# Patient Record
Sex: MALE | Race: WHITE | NOT HISPANIC OR LATINO
[De-identification: names, ages, dates, MRNs, and addresses within clinical notes are randomized per-mention and may not be internally consistent; named-entity substitution may affect disease eponyms.]

---

## 2021-11-17 PROBLEM — Z00.00 ENCOUNTER FOR PREVENTIVE HEALTH EXAMINATION: Status: ACTIVE | Noted: 2021-11-17

## 2021-12-06 ENCOUNTER — APPOINTMENT (OUTPATIENT)
Dept: PHYSICAL MEDICINE AND REHAB | Facility: CLINIC | Age: 51
End: 2021-12-06

## 2021-12-06 VITALS — HEART RATE: 78 BPM | SYSTOLIC BLOOD PRESSURE: 128 MMHG | OXYGEN SATURATION: 99 % | DIASTOLIC BLOOD PRESSURE: 90 MMHG

## 2021-12-06 DIAGNOSIS — Z87.828 PERSONAL HISTORY OF OTHER (HEALED) PHYSICAL INJURY AND TRAUMA: ICD-10-CM

## 2021-12-06 DIAGNOSIS — Z87.09 PERSONAL HISTORY OF OTHER DISEASES OF THE RESPIRATORY SYSTEM: ICD-10-CM

## 2022-01-24 ENCOUNTER — APPOINTMENT (OUTPATIENT)
Dept: PHYSICAL MEDICINE AND REHAB | Facility: CLINIC | Age: 52
End: 2022-01-24

## 2022-03-15 ENCOUNTER — APPOINTMENT (OUTPATIENT)
Dept: PHYSICAL MEDICINE AND REHAB | Facility: CLINIC | Age: 52
End: 2022-03-15

## 2022-04-11 ENCOUNTER — APPOINTMENT (OUTPATIENT)
Dept: PHYSICAL MEDICINE AND REHAB | Facility: CLINIC | Age: 52
End: 2022-04-11

## 2022-04-13 ENCOUNTER — NON-APPOINTMENT (OUTPATIENT)
Age: 52
End: 2022-04-13

## 2022-06-20 ENCOUNTER — APPOINTMENT (OUTPATIENT)
Dept: PHYSICAL MEDICINE AND REHAB | Facility: CLINIC | Age: 52
End: 2022-06-20
Payer: OTHER MISCELLANEOUS

## 2022-06-20 PROCEDURE — 99213 OFFICE O/P EST LOW 20 MIN: CPT

## 2022-06-20 PROCEDURE — 99072 ADDL SUPL MATRL&STAF TM PHE: CPT

## 2022-08-01 ENCOUNTER — APPOINTMENT (OUTPATIENT)
Dept: PHYSICAL MEDICINE AND REHAB | Facility: CLINIC | Age: 52
End: 2022-08-01

## 2022-08-01 PROCEDURE — 99213 OFFICE O/P EST LOW 20 MIN: CPT

## 2022-08-01 PROCEDURE — 99072 ADDL SUPL MATRL&STAF TM PHE: CPT

## 2022-09-14 ENCOUNTER — APPOINTMENT (OUTPATIENT)
Dept: PHYSICAL MEDICINE AND REHAB | Facility: CLINIC | Age: 52
End: 2022-09-14

## 2022-09-14 PROCEDURE — 99213 OFFICE O/P EST LOW 20 MIN: CPT

## 2022-09-14 PROCEDURE — 99072 ADDL SUPL MATRL&STAF TM PHE: CPT

## 2022-10-27 ENCOUNTER — APPOINTMENT (OUTPATIENT)
Dept: PHYSICAL MEDICINE AND REHAB | Facility: CLINIC | Age: 52
End: 2022-10-27

## 2022-10-27 PROCEDURE — 99072 ADDL SUPL MATRL&STAF TM PHE: CPT

## 2022-10-27 PROCEDURE — 99212 OFFICE O/P EST SF 10 MIN: CPT

## 2022-12-14 ENCOUNTER — APPOINTMENT (OUTPATIENT)
Dept: PHYSICAL MEDICINE AND REHAB | Facility: CLINIC | Age: 52
End: 2022-12-14

## 2022-12-14 PROCEDURE — 99213 OFFICE O/P EST LOW 20 MIN: CPT

## 2022-12-14 PROCEDURE — 99072 ADDL SUPL MATRL&STAF TM PHE: CPT

## 2023-01-30 ENCOUNTER — APPOINTMENT (OUTPATIENT)
Dept: PHYSICAL MEDICINE AND REHAB | Facility: CLINIC | Age: 53
End: 2023-01-30
Payer: OTHER MISCELLANEOUS

## 2023-01-30 PROCEDURE — 99072 ADDL SUPL MATRL&STAF TM PHE: CPT

## 2023-01-30 PROCEDURE — 99213 OFFICE O/P EST LOW 20 MIN: CPT

## 2023-03-20 ENCOUNTER — APPOINTMENT (OUTPATIENT)
Dept: PHYSICAL MEDICINE AND REHAB | Facility: CLINIC | Age: 53
End: 2023-03-20
Payer: OTHER MISCELLANEOUS

## 2023-03-20 PROCEDURE — 99213 OFFICE O/P EST LOW 20 MIN: CPT

## 2023-03-20 PROCEDURE — 99072 ADDL SUPL MATRL&STAF TM PHE: CPT

## 2023-04-24 ENCOUNTER — APPOINTMENT (OUTPATIENT)
Dept: PHYSICAL MEDICINE AND REHAB | Facility: CLINIC | Age: 53
End: 2023-04-24
Payer: OTHER MISCELLANEOUS

## 2023-04-24 PROCEDURE — 99213 OFFICE O/P EST LOW 20 MIN: CPT

## 2023-06-14 ENCOUNTER — APPOINTMENT (OUTPATIENT)
Dept: PHYSICAL MEDICINE AND REHAB | Facility: CLINIC | Age: 53
End: 2023-06-14
Payer: OTHER MISCELLANEOUS

## 2023-06-14 PROCEDURE — 99213 OFFICE O/P EST LOW 20 MIN: CPT

## 2023-08-02 ENCOUNTER — APPOINTMENT (OUTPATIENT)
Dept: PHYSICAL MEDICINE AND REHAB | Facility: CLINIC | Age: 53
End: 2023-08-02
Payer: OTHER MISCELLANEOUS

## 2023-08-02 PROCEDURE — 99213 OFFICE O/P EST LOW 20 MIN: CPT

## 2023-11-02 ENCOUNTER — APPOINTMENT (OUTPATIENT)
Dept: PHYSICAL MEDICINE AND REHAB | Facility: CLINIC | Age: 53
End: 2023-11-02
Payer: OTHER MISCELLANEOUS

## 2023-11-02 PROCEDURE — 99213 OFFICE O/P EST LOW 20 MIN: CPT

## 2024-01-08 ENCOUNTER — APPOINTMENT (OUTPATIENT)
Dept: PHYSICAL MEDICINE AND REHAB | Facility: CLINIC | Age: 54
End: 2024-01-08
Payer: OTHER MISCELLANEOUS

## 2024-01-08 PROCEDURE — 99213 OFFICE O/P EST LOW 20 MIN: CPT

## 2024-01-08 NOTE — PHYSICAL EXAM
[Normal] : Oriented to person, place, and time, insight and judgement were intact and the affect was normal [Normal Male] : prostate smooth, symmetric with no modularity or induration [FreeTextEntry1] : Exam of Lumbar Spine  Flexion 50 degrees Extension 5 degrees Lateral Bend R 25 degrees     L 20 degrees  MMT L/E: Right hip flexion and abduction 4+/5 Right knee ext 5-/5   Palp: tenderness and spasm involving the bilateral gluteal musculature with greater involvement on the right. Tenderness and spasm involving the bilateral lower lumbar paraspinal musculature  EXAMINATION OF RIGHT KNEE:   Flexion: 115 Extension: full  Palpation: Medial and lateral facet of patella tender.   Swelling: [-] No increase temperature Patella Compression: [+] Louisa Test: [-] Patella Femoral Clicking: [+]    [de-identified] : see exam  [de-identified] : see exam  [de-identified] : see exam

## 2024-01-08 NOTE — ASSESSMENT
[Indicate if, in your opinion, the incident that the patient described was the competent medical cause of this injury/illness.] : The incident that the patient described was the competent medical cause of this injury/illness: Yes [Indicate if the patient's complaints are consistent with his/her history of the injury/illness.] : Indicate if the patient's complaints are consistent with his/her history of the injury/illness: Yes [Yes] : Yes, it is consistent [Are there any work limitations? (If so, explain and quantify, including the anticipated duration of the limitations)] : There are work limitations. [Can the patient return to usual work activities as indicated? If yes, indicate date___] : The patient cannot return to usual work activities as indicated. [FreeTextEntry1] : Recheck in 4 to 6 weeks\par   [FreeTextEntry5] : 100

## 2024-01-08 NOTE — HISTORY OF PRESENT ILLNESS
[Has the patient missed work because of the injury/illness?] : The patient has missed work because of the injury/illness. [No] : The patient is currently not working. [FreeTextEntry1] : Patient continues with c/o low back pain with intermittent radiation of pain and paresthesia involving his bilateral L/E's with greater involvement on the right. He remains under the care of his Neurologist. Continues to receive trigger point injections with neurologist which are beneficial pt is taking gabapentin, Flexeril, lidocaine patches. Also complains of right knee pain. Pt reports recent exacerbation of low back pain after walking approximately 20 minutes.    [FreeTextEntry2] :   [FreeTextEntry3] : Heavy lifting, pushing, pulling, and carrying activities. [FreeTextEntry4] : see progress notes\par  s/p lumbar spine fusion [FreeTextEntry5] : 2004  [FreeTextEntry6] : 03/26/2015

## 2024-03-06 ENCOUNTER — APPOINTMENT (OUTPATIENT)
Dept: PHYSICAL MEDICINE AND REHAB | Facility: CLINIC | Age: 54
End: 2024-03-06
Payer: OTHER MISCELLANEOUS

## 2024-03-06 PROCEDURE — 99213 OFFICE O/P EST LOW 20 MIN: CPT

## 2024-03-06 NOTE — ASSESSMENT
[Indicate if, in your opinion, the incident that the patient described was the competent medical cause of this injury/illness.] : The incident that the patient described was the competent medical cause of this injury/illness: Yes [Indicate if the patient's complaints are consistent with his/her history of the injury/illness.] : Indicate if the patient's complaints are consistent with his/her history of the injury/illness: Yes [Yes] : Yes, it is consistent [Are there any work limitations? (If so, explain and quantify, including the anticipated duration of the limitations)] : There are work limitations. [FreeTextEntry1] : Home exercise plan reviewed with patient. Stressed the importance for the need for frequent change in position from sit to stand and stand to sit, as well as use of weight shifting techniques to alleviate low back discomfort. Instruction in proper posturing, body mechanics and lifting techniques.   Moist heat for muscle relaxation.  Follow-up with neurologist Recheck 2 to 3 months.  [Can the patient return to usual work activities as indicated? If yes, indicate date___] : The patient cannot return to usual work activities as indicated. [FreeTextEntry5] : 100

## 2024-03-06 NOTE — PHYSICAL EXAM
[Normal] : Oriented to person, place, and time, insight and judgement were intact and the affect was normal [Normal Male] : prostate smooth, symmetric with no modularity or induration [FreeTextEntry1] : Exam of Lumbar Spine  Flexion 50 degrees Extension 10 degrees Lateral Bend R 25 degrees     L 25 degrees  MMT L/E: Right hip flexion and abduction 4+/5 Right knee ext 5-/5   Palp: tenderness and spasm involving the bilateral gluteal musculature with greater involvement on the right. Tenderness and spasm involving the bilateral lower lumbar paraspinal musculature  EXAMINATION OF RIGHT KNEE:   Flexion: 110 degrees Extension: full  Palpation: Medial and lateral facet of patella tender.   Swelling: [-] No increase temperature Patella Compression: [+] Louisa Test: [-] Patella Femoral Clicking: [+]    [de-identified] : see exam  [de-identified] : see exam  [de-identified] : see exam

## 2024-03-06 NOTE — HISTORY OF PRESENT ILLNESS
[Has the patient missed work because of the injury/illness?] : The patient has missed work because of the injury/illness. [No] : The patient is currently not working. [FreeTextEntry1] : Patient continues with c/o low back pain with intermittent radiation of pain and paresthesia involving his bilateral L/E's with greater involvement on the right. He remains under the care of his Neurologist. Continues to receive trigger point injections with neurologist which are beneficial pt is taking gabapentin, Flexeril, lidocaine patches. Also complains of right knee pain.  Low back pain is aggravated with prolonged sitting standing and/or ambulation   [FreeTextEntry2] :   [FreeTextEntry3] : Heavy lifting, pushing, pulling, and carrying activities. [FreeTextEntry5] : 2004  [FreeTextEntry4] : see progress notes\par  s/p lumbar spine fusion [FreeTextEntry6] : 03/26/2015

## 2024-03-06 NOTE — PHYSICAL EXAM
[Normal] : Oriented to person, place, and time, insight and judgement were intact and the affect was normal [Normal Male] : prostate smooth, symmetric with no modularity or induration [FreeTextEntry1] : Exam of Lumbar Spine  Flexion 50 degrees Extension 10 degrees Lateral Bend R 25 degrees     L 25 degrees  MMT L/E: Right hip flexion and abduction 4+/5 Right knee ext 5-/5   Palp: tenderness and spasm involving the bilateral gluteal musculature with greater involvement on the right. Tenderness and spasm involving the bilateral lower lumbar paraspinal musculature  EXAMINATION OF RIGHT KNEE:   Flexion: 110 degrees Extension: full  Palpation: Medial and lateral facet of patella tender.   Swelling: [-] No increase temperature Patella Compression: [+] Louisa Test: [-] Patella Femoral Clicking: [+]    [de-identified] : see exam  [de-identified] : see exam  [de-identified] : see exam

## 2024-03-06 NOTE — ASSESSMENT
[Indicate if, in your opinion, the incident that the patient described was the competent medical cause of this injury/illness.] : The incident that the patient described was the competent medical cause of this injury/illness: Yes [Indicate if the patient's complaints are consistent with his/her history of the injury/illness.] : Indicate if the patient's complaints are consistent with his/her history of the injury/illness: Yes [Yes] : Yes, it is consistent [Are there any work limitations? (If so, explain and quantify, including the anticipated duration of the limitations)] : There are work limitations. [Can the patient return to usual work activities as indicated? If yes, indicate date___] : The patient cannot return to usual work activities as indicated. [FreeTextEntry1] : Home exercise plan reviewed with patient. Stressed the importance for the need for frequent change in position from sit to stand and stand to sit, as well as use of weight shifting techniques to alleviate low back discomfort. Instruction in proper posturing, body mechanics and lifting techniques.   Moist heat for muscle relaxation.  Follow-up with neurologist Recheck 2 to 3 months.  [FreeTextEntry5] : 100

## 2024-05-20 ENCOUNTER — APPOINTMENT (OUTPATIENT)
Dept: PHYSICAL MEDICINE AND REHAB | Facility: CLINIC | Age: 54
End: 2024-05-20
Payer: OTHER MISCELLANEOUS

## 2024-05-20 DIAGNOSIS — K21.9 GASTRO-ESOPHAGEAL REFLUX DISEASE W/OUT ESOPHAGITIS: ICD-10-CM

## 2024-05-20 DIAGNOSIS — M22.41 CHONDROMALACIA PATELLAE, RIGHT KNEE: ICD-10-CM

## 2024-05-20 PROCEDURE — 99213 OFFICE O/P EST LOW 20 MIN: CPT

## 2024-05-20 NOTE — HISTORY OF PRESENT ILLNESS
[Has the patient missed work because of the injury/illness?] : The patient has missed work because of the injury/illness. [No] : The patient is currently not working. [FreeTextEntry1] : Patient continues with c/o low back pain with intermittent radiation of pain and paresthesia involving his bilateral L/E's with greater involvement on the right. He remains under the care of his Neurologist. Continues to receive trigger point injections with neurologist which are beneficial pt is taking gabapentin, Flexeril, lidocaine patches. Also complains of right knee pain.  Low back pain is aggravated with prolonged sitting standing and/or ambulation   [FreeTextEntry2] :   [FreeTextEntry3] : Heavy lifting, pushing, pulling, and carrying activities. [FreeTextEntry4] : see progress notes\par  s/p lumbar spine fusion [FreeTextEntry5] : 2004  [FreeTextEntry6] : 03/26/2015

## 2024-05-20 NOTE — PHYSICAL EXAM
[Normal] : Oriented to person, place, and time, insight and judgement were intact and the affect was normal [Normal Male] : prostate smooth, symmetric with no modularity or induration [FreeTextEntry1] : Exam of Lumbar Spine  Flexion 50 degrees Extension 12 degrees Lateral Bend R 30 degrees     L 25 degrees  MMT L/E: Right hip flexion and abduction 4+/5 Right knee ext 5-/5   Palp: tenderness and spasm involving the bilateral gluteal musculature with greater involvement on the right. Tenderness and spasm involving the bilateral lower lumbar paraspinal musculature  EXAMINATION OF RIGHT KNEE:   Flexion: 112 degrees Extension: full  Palpation: Medial and lateral facet of patella tender.   Swelling: [-] No increase temperature Patella Compression: [+] Louisa Test: [-] Patella Femoral Clicking: [+]    [de-identified] : see exam  [de-identified] : see exam  [de-identified] : see exam

## 2024-06-26 ENCOUNTER — APPOINTMENT (OUTPATIENT)
Dept: PHYSICAL MEDICINE AND REHAB | Facility: CLINIC | Age: 54
End: 2024-06-26
Payer: OTHER MISCELLANEOUS

## 2024-06-26 DIAGNOSIS — M54.50 LOW BACK PAIN, UNSPECIFIED: ICD-10-CM

## 2024-06-26 DIAGNOSIS — Z98.1 ARTHRODESIS STATUS: ICD-10-CM

## 2024-06-26 DIAGNOSIS — G89.29 LOW BACK PAIN, UNSPECIFIED: ICD-10-CM

## 2024-06-26 DIAGNOSIS — M79.18 MYALGIA, OTHER SITE: ICD-10-CM

## 2024-06-26 DIAGNOSIS — M47.816 SPONDYLOSIS W/OUT MYELOPATHY OR RADICULOPATHY, LUMBAR REGION: ICD-10-CM

## 2024-06-26 DIAGNOSIS — M54.16 RADICULOPATHY, LUMBAR REGION: ICD-10-CM

## 2024-06-26 PROCEDURE — 99213 OFFICE O/P EST LOW 20 MIN: CPT

## 2024-08-19 ENCOUNTER — APPOINTMENT (OUTPATIENT)
Dept: PHYSICAL MEDICINE AND REHAB | Facility: CLINIC | Age: 54
End: 2024-08-19
Payer: OTHER MISCELLANEOUS

## 2024-08-19 DIAGNOSIS — Z98.1 ARTHRODESIS STATUS: ICD-10-CM

## 2024-08-19 DIAGNOSIS — M22.41 CHONDROMALACIA PATELLAE, RIGHT KNEE: ICD-10-CM

## 2024-08-19 DIAGNOSIS — M54.50 LOW BACK PAIN, UNSPECIFIED: ICD-10-CM

## 2024-08-19 DIAGNOSIS — G89.29 LOW BACK PAIN, UNSPECIFIED: ICD-10-CM

## 2024-08-19 DIAGNOSIS — M47.816 SPONDYLOSIS W/OUT MYELOPATHY OR RADICULOPATHY, LUMBAR REGION: ICD-10-CM

## 2024-08-19 DIAGNOSIS — M79.18 MYALGIA, OTHER SITE: ICD-10-CM

## 2024-08-19 DIAGNOSIS — M54.16 RADICULOPATHY, LUMBAR REGION: ICD-10-CM

## 2024-08-19 PROCEDURE — 99213 OFFICE O/P EST LOW 20 MIN: CPT

## 2024-08-19 NOTE — HISTORY OF PRESENT ILLNESS
[FreeTextEntry1] : Patient continues with c/o low back pain with intermittent radiation of pain and paresthesia involving his bilateral L/E's with greater involvement on the right. He remains under the care of his Neurologist. Continues to with neurologist  pt is taking gabapentin, Flexeril, lidocaine patches. Also complains of right knee pain.  Low back pain is aggravated with prolonged sitting standing and/or ambulation   [FreeTextEntry2] :   [FreeTextEntry3] : Heavy lifting, pushing, pulling, and carrying activities. [FreeTextEntry4] : see progress notes\par  s/p lumbar spine fusion [FreeTextEntry5] : 2004  [Has the patient missed work because of the injury/illness?] : The patient has missed work because of the injury/illness. [No] : The patient is currently not working. [FreeTextEntry6] : 03/26/2015

## 2024-08-19 NOTE — ASSESSMENT
[Indicate if, in your opinion, the incident that the patient described was the competent medical cause of this injury/illness.] : The incident that the patient described was the competent medical cause of this injury/illness: Yes [Indicate if the patient's complaints are consistent with his/her history of the injury/illness.] : Indicate if the patient's complaints are consistent with his/her history of the injury/illness: Yes [Yes] : Yes, it is consistent [Can the patient return to usual work activities as indicated? If yes, indicate date___] : The patient cannot return to usual work activities as indicated. [Are there any work limitations? (If so, explain and quantify, including the anticipated duration of the limitations)] : There are work limitations. [FreeTextEntry1] : Recheck in 4 to 6 weeks\par   [FreeTextEntry5] : 100

## 2024-08-19 NOTE — PHYSICAL EXAM
[FreeTextEntry1] : Exam of Lumbar Spine  Flexion 50 degrees Extension 15 degrees Lateral Bend R 30 degrees     L 25 degrees  MMT L/E: Right hip flexion and abduction 4+/5 Right knee ext 5-/5   Palp: tenderness and spasm involving the bilateral gluteal musculature with greater involvement on the right. Tenderness and spasm involving the bilateral lower lumbar paraspinal musculature  EXAMINATION OF RIGHT KNEE:   Flexion: 110 degrees Extension: full  Palpation: Medial and lateral facet of patella tender.   Swelling: [-] No increase temperature Patella Compression: [+] Louisa Test: [-] Patella Femoral Clicking: [+]    [Normal] : Oriented to person, place, and time, insight and judgement were intact and the affect was normal [Normal Male] : prostate smooth, symmetric with no modularity or induration [de-identified] : see exam  [de-identified] : see exam  [de-identified] : see exam

## 2024-09-30 ENCOUNTER — APPOINTMENT (OUTPATIENT)
Dept: PHYSICAL MEDICINE AND REHAB | Facility: CLINIC | Age: 54
End: 2024-09-30
Payer: OTHER MISCELLANEOUS

## 2024-09-30 DIAGNOSIS — Z98.1 ARTHRODESIS STATUS: ICD-10-CM

## 2024-09-30 DIAGNOSIS — M47.816 SPONDYLOSIS W/OUT MYELOPATHY OR RADICULOPATHY, LUMBAR REGION: ICD-10-CM

## 2024-09-30 DIAGNOSIS — M54.16 RADICULOPATHY, LUMBAR REGION: ICD-10-CM

## 2024-09-30 DIAGNOSIS — G89.29 LOW BACK PAIN, UNSPECIFIED: ICD-10-CM

## 2024-09-30 DIAGNOSIS — M54.50 LOW BACK PAIN, UNSPECIFIED: ICD-10-CM

## 2024-09-30 DIAGNOSIS — M79.18 MYALGIA, OTHER SITE: ICD-10-CM

## 2024-09-30 DIAGNOSIS — M22.41 CHONDROMALACIA PATELLAE, RIGHT KNEE: ICD-10-CM

## 2024-09-30 PROCEDURE — 99213 OFFICE O/P EST LOW 20 MIN: CPT

## 2024-09-30 NOTE — HISTORY OF PRESENT ILLNESS
[FreeTextEntry1] : Patient continues with c/o low back pain with intermittent radiation of pain and paresthesia involving his bilateral L/E's with greater involvement on the right. He remains under the care of his Neurologist. Continues to with neurologist  pt is taking gabapentin, Flexeril, lidocaine patches.  Patient reports he continues to receive TPI therapy every 3 months with his neurologist.  Also complains of right knee pain.  Low back pain is aggravated with prolonged sitting standing and/or ambulation   [FreeTextEntry2] :   [FreeTextEntry3] : Heavy lifting, pushing, pulling, and carrying activities. [FreeTextEntry4] : see progress notes\par  s/p lumbar spine fusion [FreeTextEntry5] : 2004  [Has the patient missed work because of the injury/illness?] : The patient has missed work because of the injury/illness. [No] : The patient is currently not working. [FreeTextEntry6] : 03/26/2015

## 2024-09-30 NOTE — PHYSICAL EXAM
[FreeTextEntry1] : Exam of Lumbar Spine  Flexion 55 degrees Extension 15 degrees Lateral Bend R 30 degrees     L 20 degrees  MMT L/E: Right hip flexion and abduction 4+/5 Right knee extension  5-/5   Palp: tenderness and spasm involving the bilateral gluteal musculature with greater involvement on the right. Tenderness and spasm involving the bilateral lower lumbar paraspinal musculature  EXAMINATION OF RIGHT KNEE:   Flexion: 105 degrees Extension: full  Palpation: Medial and lateral facet of patella tender.   Swelling: [-] No increase temperature Patella Compression: [+] Louisa Test: [-] Patella Femoral Clicking: [+]    [Normal] : Oriented to person, place, and time, insight and judgement were intact and the affect was normal [Normal Male] : prostate smooth, symmetric with no modularity or induration [de-identified] : see exam  [de-identified] : see exam  [de-identified] : see exam

## 2024-12-26 ENCOUNTER — APPOINTMENT (OUTPATIENT)
Dept: PHYSICAL MEDICINE AND REHAB | Facility: CLINIC | Age: 54
End: 2024-12-26
Payer: OTHER MISCELLANEOUS

## 2024-12-26 DIAGNOSIS — M47.816 SPONDYLOSIS W/OUT MYELOPATHY OR RADICULOPATHY, LUMBAR REGION: ICD-10-CM

## 2024-12-26 DIAGNOSIS — M79.18 MYALGIA, OTHER SITE: ICD-10-CM

## 2024-12-26 DIAGNOSIS — G89.29 LOW BACK PAIN, UNSPECIFIED: ICD-10-CM

## 2024-12-26 DIAGNOSIS — Z98.1 ARTHRODESIS STATUS: ICD-10-CM

## 2024-12-26 DIAGNOSIS — M54.16 RADICULOPATHY, LUMBAR REGION: ICD-10-CM

## 2024-12-26 DIAGNOSIS — M54.50 LOW BACK PAIN, UNSPECIFIED: ICD-10-CM

## 2024-12-26 PROCEDURE — 99213 OFFICE O/P EST LOW 20 MIN: CPT

## 2025-02-27 ENCOUNTER — APPOINTMENT (OUTPATIENT)
Dept: PHYSICAL MEDICINE AND REHAB | Facility: CLINIC | Age: 55
End: 2025-02-27
Payer: OTHER MISCELLANEOUS

## 2025-02-27 DIAGNOSIS — M79.18 MYALGIA, OTHER SITE: ICD-10-CM

## 2025-02-27 DIAGNOSIS — M54.16 RADICULOPATHY, LUMBAR REGION: ICD-10-CM

## 2025-02-27 DIAGNOSIS — M47.816 SPONDYLOSIS W/OUT MYELOPATHY OR RADICULOPATHY, LUMBAR REGION: ICD-10-CM

## 2025-02-27 DIAGNOSIS — G89.29 LOW BACK PAIN, UNSPECIFIED: ICD-10-CM

## 2025-02-27 DIAGNOSIS — Z98.1 ARTHRODESIS STATUS: ICD-10-CM

## 2025-02-27 DIAGNOSIS — M54.50 LOW BACK PAIN, UNSPECIFIED: ICD-10-CM

## 2025-02-27 PROCEDURE — 99213 OFFICE O/P EST LOW 20 MIN: CPT

## 2025-04-28 ENCOUNTER — APPOINTMENT (OUTPATIENT)
Dept: PHYSICAL MEDICINE AND REHAB | Facility: CLINIC | Age: 55
End: 2025-04-28

## 2025-05-05 ENCOUNTER — APPOINTMENT (OUTPATIENT)
Dept: PHYSICAL MEDICINE AND REHAB | Facility: CLINIC | Age: 55
End: 2025-05-05
Payer: OTHER MISCELLANEOUS

## 2025-05-05 DIAGNOSIS — M22.41 CHONDROMALACIA PATELLAE, RIGHT KNEE: ICD-10-CM

## 2025-05-05 DIAGNOSIS — G89.29 LOW BACK PAIN, UNSPECIFIED: ICD-10-CM

## 2025-05-05 DIAGNOSIS — M47.816 SPONDYLOSIS W/OUT MYELOPATHY OR RADICULOPATHY, LUMBAR REGION: ICD-10-CM

## 2025-05-05 DIAGNOSIS — M79.18 MYALGIA, OTHER SITE: ICD-10-CM

## 2025-05-05 DIAGNOSIS — M54.16 RADICULOPATHY, LUMBAR REGION: ICD-10-CM

## 2025-05-05 DIAGNOSIS — Z98.1 ARTHRODESIS STATUS: ICD-10-CM

## 2025-05-05 DIAGNOSIS — M54.50 LOW BACK PAIN, UNSPECIFIED: ICD-10-CM

## 2025-05-05 PROCEDURE — 99213 OFFICE O/P EST LOW 20 MIN: CPT

## 2025-08-04 ENCOUNTER — APPOINTMENT (OUTPATIENT)
Dept: PHYSICAL MEDICINE AND REHAB | Facility: CLINIC | Age: 55
End: 2025-08-04
Payer: OTHER MISCELLANEOUS

## 2025-08-04 DIAGNOSIS — M79.18 MYALGIA, OTHER SITE: ICD-10-CM

## 2025-08-04 DIAGNOSIS — M47.816 SPONDYLOSIS W/OUT MYELOPATHY OR RADICULOPATHY, LUMBAR REGION: ICD-10-CM

## 2025-08-04 DIAGNOSIS — M54.16 RADICULOPATHY, LUMBAR REGION: ICD-10-CM

## 2025-08-04 DIAGNOSIS — M54.50 LOW BACK PAIN, UNSPECIFIED: ICD-10-CM

## 2025-08-04 DIAGNOSIS — Z98.1 ARTHRODESIS STATUS: ICD-10-CM

## 2025-08-04 DIAGNOSIS — G89.29 LOW BACK PAIN, UNSPECIFIED: ICD-10-CM

## 2025-08-04 DIAGNOSIS — M22.41 CHONDROMALACIA PATELLAE, RIGHT KNEE: ICD-10-CM

## 2025-08-04 PROCEDURE — 99213 OFFICE O/P EST LOW 20 MIN: CPT
